# Patient Record
(demographics unavailable — no encounter records)

---

## 2024-12-10 NOTE — ASSESSMENT
[FreeTextEntry1] : History of right breast ADH Family history of breast cancer, with increased ROCK lifetime breast cancer risk Clinical breast exam benign  1. Annual bilateral mammogram and breast ultrasound due 12/2025 2. Follow up office visit due in 1 year 3. Advised monthly self breast examinations and advised her to contact me if she has any concerns. 4. Bilateral breast MRI with IV contrast due 6/2025 5. She inquired about taking HRT due to difficulty sleeping. I advised against it due to concern for an increased risk of breast cancer.  6.  She inquired as to whether she should undergo genetic testing.  I discussed with her that since her sister who had the breast cancer tested negative for genetic testing that she herself does not need testing.

## 2024-12-10 NOTE — CONSULT LETTER
[Dear  ___] : Dear  [unfilled], [Consult Letter:] : I had the pleasure of evaluating your patient, [unfilled]. [Please see my note below.] : Please see my note below. [Sincerely,] : Sincerely, [DrLuis  ___] : Dr. MCNEIL [FreeTextEntry3] : Susan M. Palleschi, MD, FACS\par  Division of Breast Surgery\par  Director, Breast Surgery\par  [facility]\par  [address] [place] [address]\par  [address]\[place] [address]\par  (Phone) [phone]\par  (Fax) [phone]

## 2024-12-10 NOTE — PHYSICAL EXAM
[Normocephalic] : normocephalic [Atraumatic] : atraumatic [EOMI] : extra ocular movement intact [Sclera nonicteric] : sclera nonicteric [Supple] : supple [No Supraclavicular Adenopathy] : no supraclavicular adenopathy [No Cervical Adenopathy] : no cervical adenopathy [No Thyromegaly] : no thyromegaly [Examined in the supine and seated position] : examined in the supine and seated position [No dominant masses] : no dominant masses in right breast  [No dominant masses] : no dominant masses left breast [No Nipple Retraction] : no left nipple retraction [No Nipple Discharge] : no left nipple discharge [No Axillary Lymphadenopathy] : no left axillary lymphadenopathy [No Edema] : no edema [No Rashes] : no rashes [No Ulceration] : no ulceration [de-identified] : well healed bilateral breast reduction incisions [de-identified] : Area of pt's concern left axilla consistent with slight excess skin at edge of T scar when pt raises arm. No axillary masses.

## 2024-12-10 NOTE — HISTORY OF PRESENT ILLNESS
[FreeTextEntry1] : Patient is a 53 year old female here today for a follow up visit.  She has a family history of breast cancer in her sister, diagnosed with DCIS at age 55. Genetic testing was negative. She has a history of right ADH s/p right 3:00 Mary  localization breast biopsy 7/29/2021. Pathology revealed radial scar, sclerosing duct papilloma, and prior biopsy changes. Med ONC: she met with Dr. Oneil and she deferred anti-hormonal medication. 2/14/2022 s/p left breast skin and tissue, breast reduction with Dr. Rodríguez: Pathology revealed benign breast tissue. Skin without significant pathologic diagnosis. Excess left axillary tissue excision: Pathology revealed benign breast tissue with incidental small intraductal papilloma with florid ductal hyperplasia. Skin without significant pathologic diagnosis. 5/31/2024 Bilateral breast MRI:  RIGHT BREAST: Stable postsurgical changes per  There are scattered enhancing nonspecific foci.  There is no suspicious enhancement in the right breast. LEFT BREAST: Stable postsurgical changes.  There are scattered enhancing nonspecific foci.  There is no suspicious enhancement in the left breast. AXILLA/OTHER: There is no significant axillary or internal mammary lymphadenopathy. BI-RADS 2 12/6/2024 Bilateral mammogram: Tyrer-Cuzick Lifetime Risk: 34.4%. The breasts are heterogeneously dense, which may obscure small masses. No suspicious mass, suspicious microcalcifications, or other sign of malignancy is identified. BREAST ARTERIAL CALCIFICATION (PEYMAN): Grade 0 - No vascular calcifications. Note: The absence of breast arterial calcification does not exclude cardiovascular disease. Management of cardiovascular risk factors should be based clinically. Bilateral ultrasound: negative. BI-RADS 1 She denies any current breast concerns.